# Patient Record
Sex: FEMALE | Race: BLACK OR AFRICAN AMERICAN | NOT HISPANIC OR LATINO | URBAN - METROPOLITAN AREA
[De-identification: names, ages, dates, MRNs, and addresses within clinical notes are randomized per-mention and may not be internally consistent; named-entity substitution may affect disease eponyms.]

---

## 2020-07-25 ENCOUNTER — TELEPHONE ENCOUNTER (OUTPATIENT)
Dept: URBAN - METROPOLITAN AREA CLINIC 13 | Facility: CLINIC | Age: 55
End: 2020-07-25

## 2020-07-26 ENCOUNTER — TELEPHONE ENCOUNTER (OUTPATIENT)
Dept: URBAN - METROPOLITAN AREA CLINIC 13 | Facility: CLINIC | Age: 55
End: 2020-07-26

## 2020-07-26 RX ORDER — ALENDRONATE SODIUM 70 MG/1
TAKE 1 TABLET ONCE WEEKLY TABLET ORAL
Refills: 0 | Status: ACTIVE | COMMUNITY

## 2020-07-26 RX ORDER — PREDNISONE 5 MG/1
TAKE 1 TABLET AS DIRECTED TABLET ORAL
Qty: 7 | Refills: 0 | Status: ACTIVE | COMMUNITY

## 2020-07-26 RX ORDER — POLYETHYLENE GLYCOL 3350, SODIUM SULFATE, SODIUM CHLORIDE, POTASSIUM CHLORIDE, ASCORBIC ACID, SODIUM ASCORBATE 7.5-2.691G
TAKE 32 OZ AS DIRECTED 5:00PM THE EVENING BEFORE AND 6HR PRIOR TO PROCEDURE KIT ORAL
Qty: 1 | Refills: 0 | Status: ACTIVE | COMMUNITY
Start: 2016-09-20

## 2020-07-26 RX ORDER — TEMAZEPAM 15 MG/1
TAKE 1 CAPSULE AT BEDTIME AS NEEDED FOR SLEEP CAPSULE ORAL
Refills: 0 | Status: ACTIVE | COMMUNITY

## 2020-07-26 RX ORDER — AMILORIDE HYDROCHLORIDE AND HYDROCHLOROTHIAZIDE 5; 50 MG/1; MG/1
TAKE 1 TABLET ONCE DAILY TABLET ORAL
Refills: 0 | Status: ACTIVE | COMMUNITY

## 2020-07-26 RX ORDER — BOSENTAN 125 MG/1
TAKE 1 TABLET TWICE DAILY TABLET, FILM COATED ORAL
Refills: 0 | Status: ACTIVE | COMMUNITY

## 2020-07-26 RX ORDER — ZOLPIDEM TARTRATE 10 MG/1
TAKE 1 TABLET AT BEDTIME AS NEEDED FOR SLEEP TABLET, FILM COATED ORAL
Refills: 0 | Status: ACTIVE | COMMUNITY

## 2020-07-26 RX ORDER — TRAMADOL HYDROCHLORIDE 50 MG/1
TAKE 1 TABLET BY MOUTH EVERY 6 HOURS AS NEEDED FOR PAIN TABLET ORAL
Qty: 50 | Refills: 3 | Status: ACTIVE | COMMUNITY

## 2021-05-26 ENCOUNTER — LAB OUTSIDE AN ENCOUNTER (OUTPATIENT)
Dept: URBAN - METROPOLITAN AREA CLINIC 113 | Facility: CLINIC | Age: 56
End: 2021-05-26

## 2021-05-26 ENCOUNTER — TELEPHONE ENCOUNTER (OUTPATIENT)
Dept: URBAN - METROPOLITAN AREA CLINIC 113 | Facility: CLINIC | Age: 56
End: 2021-05-26

## 2021-05-26 VITALS — BODY MASS INDEX: 21.5 KG/M2 | HEIGHT: 67 IN | WEIGHT: 137 LBS

## 2021-05-26 RX ORDER — POLYETHYLENE GLYCOL 3350, SODIUM SULFATE, SODIUM CHLORIDE, POTASSIUM CHLORIDE, ASCORBIC ACID, SODIUM ASCORBATE 7.5-2.691G
TAKE 32 OZ AS DIRECTED 5:00PM THE EVENING BEFORE AND 6HR PRIOR TO PROCEDURE KIT ORAL
Qty: 1 | Refills: 0 | Status: ON HOLD | COMMUNITY
Start: 2016-09-20

## 2021-05-26 RX ORDER — SODIUM, POTASSIUM,MAG SULFATES 17.5-3.13G
354 ML SOLUTION, RECONSTITUTED, ORAL ORAL
Qty: 354 MILLILITER | Refills: 0 | OUTPATIENT
Start: 2021-05-26 | End: 2021-05-27

## 2021-05-26 RX ORDER — AMILORIDE HYDROCHLORIDE AND HYDROCHLOROTHIAZIDE 5; 50 MG/1; MG/1
TAKE 1 TABLET ONCE DAILY TABLET ORAL
Refills: 0 | Status: ACTIVE | COMMUNITY

## 2021-05-26 RX ORDER — ALENDRONATE SODIUM 70 MG/1
TAKE 1 TABLET ONCE WEEKLY TABLET ORAL
Refills: 0 | Status: ON HOLD | COMMUNITY

## 2021-05-26 RX ORDER — BOSENTAN 125 MG/1
TAKE 1 TABLET TWICE DAILY TABLET, FILM COATED ORAL
Refills: 0 | Status: ON HOLD | COMMUNITY

## 2021-05-26 RX ORDER — ZOLPIDEM TARTRATE 10 MG/1
TAKE 1 TABLET AT BEDTIME AS NEEDED FOR SLEEP TABLET, FILM COATED ORAL
Refills: 0 | Status: ON HOLD | COMMUNITY

## 2021-05-26 RX ORDER — TEMAZEPAM 15 MG/1
TAKE 1 CAPSULE AT BEDTIME AS NEEDED FOR SLEEP CAPSULE ORAL
Refills: 0 | Status: ON HOLD | COMMUNITY

## 2021-05-26 RX ORDER — TRAMADOL HYDROCHLORIDE 50 MG/1
TAKE 1 TABLET BY MOUTH EVERY 6 HOURS AS NEEDED FOR PAIN TABLET ORAL
Qty: 50 | Refills: 3 | Status: ON HOLD | COMMUNITY

## 2021-05-26 RX ORDER — PREDNISONE 5 MG/1
TAKE 1 TABLET AS DIRECTED TABLET ORAL
Qty: 7 | Refills: 0 | Status: ON HOLD | COMMUNITY

## 2021-05-27 ENCOUNTER — TELEPHONE ENCOUNTER (OUTPATIENT)
Dept: URBAN - METROPOLITAN AREA CLINIC 113 | Facility: CLINIC | Age: 56
End: 2021-05-27

## 2021-05-27 RX ORDER — SODIUM, POTASSIUM,MAG SULFATES 17.5-3.13G
354 ML SOLUTION, RECONSTITUTED, ORAL ORAL
Qty: 354 MILLILITER | Refills: 0 | OUTPATIENT
Start: 2021-05-27 | End: 2021-05-28

## 2021-06-03 ENCOUNTER — TELEPHONE ENCOUNTER (OUTPATIENT)
Dept: URBAN - METROPOLITAN AREA CLINIC 96 | Facility: CLINIC | Age: 56
End: 2021-06-03

## 2021-06-24 ENCOUNTER — CLAIMS CREATED FROM THE CLAIM WINDOW (OUTPATIENT)
Dept: URBAN - METROPOLITAN AREA CLINIC 4 | Facility: CLINIC | Age: 56
End: 2021-06-24
Payer: MEDICARE

## 2021-06-24 ENCOUNTER — OFFICE VISIT (OUTPATIENT)
Dept: URBAN - METROPOLITAN AREA SURGERY CENTER 25 | Facility: SURGERY CENTER | Age: 56
End: 2021-06-24
Payer: MEDICARE

## 2021-06-24 DIAGNOSIS — Z86.010 H/O ADENOMATOUS POLYP OF COLON: ICD-10-CM

## 2021-06-24 DIAGNOSIS — D12.2 ADENOMA OF ASCENDING COLON: ICD-10-CM

## 2021-06-24 DIAGNOSIS — D12.2 BENIGN NEOPLASM OF ASCENDING COLON: ICD-10-CM

## 2021-06-24 PROCEDURE — 88305 TISSUE EXAM BY PATHOLOGIST: CPT | Performed by: PATHOLOGY

## 2021-06-24 PROCEDURE — 45385 COLONOSCOPY W/LESION REMOVAL: CPT | Performed by: INTERNAL MEDICINE

## 2021-06-24 PROCEDURE — G8907 PT DOC NO EVENTS ON DISCHARG: HCPCS | Performed by: INTERNAL MEDICINE

## 2021-06-24 RX ORDER — POLYETHYLENE GLYCOL 3350, SODIUM SULFATE, SODIUM CHLORIDE, POTASSIUM CHLORIDE, ASCORBIC ACID, SODIUM ASCORBATE 7.5-2.691G
TAKE 32 OZ AS DIRECTED 5:00PM THE EVENING BEFORE AND 6HR PRIOR TO PROCEDURE KIT ORAL
Qty: 1 | Refills: 0 | Status: ON HOLD | COMMUNITY
Start: 2016-09-20

## 2021-06-24 RX ORDER — PREDNISONE 5 MG/1
TAKE 1 TABLET AS DIRECTED TABLET ORAL
Qty: 7 | Refills: 0 | Status: ON HOLD | COMMUNITY

## 2021-06-24 RX ORDER — TRAMADOL HYDROCHLORIDE 50 MG/1
TAKE 1 TABLET BY MOUTH EVERY 6 HOURS AS NEEDED FOR PAIN TABLET ORAL
Qty: 50 | Refills: 3 | Status: ON HOLD | COMMUNITY

## 2021-06-24 RX ORDER — ALENDRONATE SODIUM 70 MG/1
TAKE 1 TABLET ONCE WEEKLY TABLET ORAL
Refills: 0 | Status: ON HOLD | COMMUNITY

## 2021-06-24 RX ORDER — TEMAZEPAM 15 MG/1
TAKE 1 CAPSULE AT BEDTIME AS NEEDED FOR SLEEP CAPSULE ORAL
Refills: 0 | Status: ON HOLD | COMMUNITY

## 2021-06-24 RX ORDER — BOSENTAN 125 MG/1
TAKE 1 TABLET TWICE DAILY TABLET, FILM COATED ORAL
Refills: 0 | Status: ON HOLD | COMMUNITY

## 2021-06-24 RX ORDER — AMILORIDE HYDROCHLORIDE AND HYDROCHLOROTHIAZIDE 5; 50 MG/1; MG/1
TAKE 1 TABLET ONCE DAILY TABLET ORAL
Refills: 0 | Status: ACTIVE | COMMUNITY

## 2021-06-24 RX ORDER — ZOLPIDEM TARTRATE 10 MG/1
TAKE 1 TABLET AT BEDTIME AS NEEDED FOR SLEEP TABLET, FILM COATED ORAL
Refills: 0 | Status: ON HOLD | COMMUNITY

## 2021-07-09 ENCOUNTER — OFFICE VISIT (OUTPATIENT)
Dept: URBAN - METROPOLITAN AREA CLINIC 113 | Facility: CLINIC | Age: 56
End: 2021-07-09

## 2022-06-29 ENCOUNTER — WEB ENCOUNTER (OUTPATIENT)
Dept: URBAN - METROPOLITAN AREA CLINIC 113 | Facility: CLINIC | Age: 57
End: 2022-06-29

## 2022-06-30 ENCOUNTER — LAB OUTSIDE AN ENCOUNTER (OUTPATIENT)
Dept: URBAN - METROPOLITAN AREA CLINIC 113 | Facility: CLINIC | Age: 57
End: 2022-06-30

## 2022-06-30 ENCOUNTER — OFFICE VISIT (OUTPATIENT)
Dept: URBAN - METROPOLITAN AREA CLINIC 113 | Facility: CLINIC | Age: 57
End: 2022-06-30
Payer: MEDICARE

## 2022-06-30 VITALS
WEIGHT: 150 LBS | BODY MASS INDEX: 23.54 KG/M2 | DIASTOLIC BLOOD PRESSURE: 76 MMHG | RESPIRATION RATE: 18 BRPM | HEART RATE: 50 BPM | TEMPERATURE: 97.3 F | SYSTOLIC BLOOD PRESSURE: 103 MMHG | HEIGHT: 67 IN

## 2022-06-30 DIAGNOSIS — Z86.010 HISTORY OF ADENOMATOUS POLYP OF COLON: ICD-10-CM

## 2022-06-30 DIAGNOSIS — R74.8 ELEVATED LIVER ENZYMES: ICD-10-CM

## 2022-06-30 DIAGNOSIS — R93.5 ABNORMAL ABDOMINAL CT SCAN: ICD-10-CM

## 2022-06-30 PROCEDURE — 99214 OFFICE O/P EST MOD 30 MIN: CPT | Performed by: NURSE PRACTITIONER

## 2022-06-30 PROCEDURE — 76700 US EXAM ABDOM COMPLETE: CPT | Performed by: NURSE PRACTITIONER

## 2022-06-30 RX ORDER — CICLOPIROX 80 MG/ML
1 APPLICATION SOLUTION TOPICAL ONCE A DAY
Status: ACTIVE | COMMUNITY

## 2022-06-30 RX ORDER — POLYETHYLENE GLYCOL 3350, SODIUM SULFATE, SODIUM CHLORIDE, POTASSIUM CHLORIDE, ASCORBIC ACID, SODIUM ASCORBATE 7.5-2.691G
TAKE 32 OZ AS DIRECTED 5:00PM THE EVENING BEFORE AND 6HR PRIOR TO PROCEDURE KIT ORAL
Qty: 1 | Refills: 0 | Status: ON HOLD | COMMUNITY
Start: 2016-09-20

## 2022-06-30 RX ORDER — ZOLPIDEM TARTRATE 10 MG/1
TAKE 1 TABLET AT BEDTIME AS NEEDED FOR SLEEP TABLET, FILM COATED ORAL
Refills: 0 | Status: ON HOLD | COMMUNITY

## 2022-06-30 RX ORDER — AMILORIDE HYDROCHLORIDE AND HYDROCHLOROTHIAZIDE 5; 50 MG/1; MG/1
TAKE 1 TABLET ONCE DAILY TABLET ORAL
Refills: 0 | Status: ACTIVE | COMMUNITY

## 2022-06-30 RX ORDER — ATORVASTATIN CALCIUM 20 MG/1
1 TABLET TABLET, FILM COATED ORAL ONCE A DAY
Status: ACTIVE | COMMUNITY

## 2022-06-30 RX ORDER — TRAMADOL HYDROCHLORIDE 50 MG/1
TAKE 1 TABLET BY MOUTH EVERY 6 HOURS AS NEEDED FOR PAIN TABLET ORAL
Qty: 50 | Refills: 3 | Status: ON HOLD | COMMUNITY

## 2022-06-30 RX ORDER — BOSENTAN 125 MG/1
TAKE 1 TABLET TWICE DAILY TABLET, FILM COATED ORAL
Refills: 0 | Status: ON HOLD | COMMUNITY

## 2022-06-30 RX ORDER — PREDNISONE 5 MG/1
TAKE 1 TABLET AS DIRECTED TABLET ORAL
Qty: 7 | Refills: 0 | Status: ON HOLD | COMMUNITY

## 2022-06-30 RX ORDER — ALENDRONATE SODIUM 70 MG/1
TAKE 1 TABLET ONCE WEEKLY TABLET ORAL
Refills: 0 | Status: ON HOLD | COMMUNITY

## 2022-06-30 RX ORDER — TEMAZEPAM 15 MG/1
TAKE 1 CAPSULE AT BEDTIME AS NEEDED FOR SLEEP CAPSULE ORAL
Refills: 0 | Status: ON HOLD | COMMUNITY

## 2022-06-30 NOTE — HPI-TODAY'S VISIT:
Ms. Simms is a 57-year-old -American female with a history of sarcoidosis complicated by pulmonary hypertension (Dr. Márquez), right-sided heart failure, fibromyalgia, presenting for evaluation of abnormal CT findings of the liver. She was previously managed in our office by Dr. Quintanilla. She was followed in our office in the past for abdominal pain, nausea and vomiting, and constipation attributed to a functional bowel disorder. Work-up at that time is outlined below. Currently, she is fairly asymptomatic from a GI standpoint. She has 2-4 nonbloody stools per day, which have been softener since starting a new cholesterol medication. She recently experienced a brief episode of right upper quadrant abdominal pain, but this has resolved. She denies nausea or vomiting. Her only complaint is of malodorous stool. She is following with ortho for management of back pain.  Recent CT of the chest without contrast 5/9/22 incidentally noted diffusely elevated liver density, most commonly seen in the setting of primary or secondary hemochromatosis or amiodarone use. Labs August 2021 show AST 45, ALT 33, , Tbili 0.4.

## 2022-06-30 NOTE — HPI-OTHER HISTORIES
Colonoscopy 6/24/21 by Dr. Stokes: a 20mm in the ascending colon, tattoo noted at 55cm, mild external hemorrhoids. Repeat colonoscopy recommended in 1 year. Colonoscopy 10/3/16 by Dr. Quintanilla: one 20mm sessile serrated adenoma at the hepatic flexure which was removed piecemeal, diverticulosis in the sigmoid colon, nonbleeding internal hemorrhoids. EGD was performed on 8/16/16 by Dr. Quintanilla. Findings included LA grade A reflux esophagitis, diffuse moderate inflammation characterized by congestion, erosions and erythema in the gastric body and antrum with bilious fluid found in the antrum. Gastric biopsies revealed mild inactive chronic gastritis with features of chemical/reactive gastropathy, focal intestinal metaplasia present, no H. pylori. H. pylori breath test was negative on 8/1/16.
2.38

## 2022-07-01 ENCOUNTER — TELEPHONE ENCOUNTER (OUTPATIENT)
Dept: URBAN - METROPOLITAN AREA CLINIC 113 | Facility: CLINIC | Age: 57
End: 2022-07-01

## 2022-07-06 LAB
A/G RATIO: 1.4
ACTIN (SMOOTH MUSCLE) ANTIBODY: 9
ALBUMIN: 4.6
ALKALINE PHOSPHATASE: 102
ALT (SGPT): 17
ANA DIRECT: NEGATIVE
AST (SGOT): 30
BASO (ABSOLUTE): 0.1
BASOS: 1
BILIRUBIN, TOTAL: 0.3
BUN/CREATININE RATIO: 21
BUN: 30
CALCIUM: 9.6
CARBON DIOXIDE, TOTAL: 23
CHLORIDE: 103
CREATININE: 1.4
EGFR: 44
EOS (ABSOLUTE): 0.3
EOS: 3
FERRITIN, SERUM: 224
GLOBULIN, TOTAL: 3.3
GLUCOSE: 79
HBSAG SCREEN: NEGATIVE
HEMATOCRIT: 39.1
HEMATOLOGY COMMENTS:: (no result)
HEMOGLOBIN: 12.7
HEP A AB, TOTAL: NEGATIVE
HEP B CORE AB, TOT: NEGATIVE
HEP B SURFACE AB, QUAL: NON REACTIVE
HEP C VIRUS AB: <0.1
HEREDITARY  HEMOCHROMATOSIS: (no result)
IMMATURE CELLS: (no result)
IMMATURE GRANS (ABS): 0
IMMATURE GRANULOCYTES: 0
IMMUNOGLOBULIN A, QN, SERUM: 404
IMMUNOGLOBULIN E, TOTAL: 28
IMMUNOGLOBULIN G, QN, SERUM: 1950
IMMUNOGLOBULIN M, QN, SERUM: 78
LYMPHS (ABSOLUTE): 2.8
LYMPHS: 36
MCH: 28.4
MCHC: 32.5
MCV: 88
MITOCHONDRIAL (M2) ANTIBODY: <20
MONOCYTES(ABSOLUTE): 0.6
MONOCYTES: 8
NEUTROPHILS (ABSOLUTE): 3.9
NEUTROPHILS: 52
NRBC: (no result)
PLATELETS: 256
POTASSIUM: 4.4
PROTEIN, TOTAL: 7.9
RBC: 4.47
RDW: 13.6
SODIUM: 141
WBC: 7.6

## 2022-07-23 LAB
REQUEST PROBLEM: (no result)
WRITTEN AUTHORIZATION: (no result)

## 2022-10-11 ENCOUNTER — OFFICE VISIT (OUTPATIENT)
Dept: URBAN - METROPOLITAN AREA CLINIC 113 | Facility: CLINIC | Age: 57
End: 2022-10-11
Payer: MEDICARE

## 2022-10-11 ENCOUNTER — TELEPHONE ENCOUNTER (OUTPATIENT)
Dept: URBAN - METROPOLITAN AREA CLINIC 113 | Facility: CLINIC | Age: 57
End: 2022-10-11

## 2022-10-11 ENCOUNTER — LAB OUTSIDE AN ENCOUNTER (OUTPATIENT)
Dept: URBAN - METROPOLITAN AREA CLINIC 113 | Facility: CLINIC | Age: 57
End: 2022-10-11

## 2022-10-11 VITALS
BODY MASS INDEX: 23.23 KG/M2 | HEART RATE: 50 BPM | RESPIRATION RATE: 16 BRPM | TEMPERATURE: 97.3 F | WEIGHT: 148 LBS | SYSTOLIC BLOOD PRESSURE: 128 MMHG | DIASTOLIC BLOOD PRESSURE: 88 MMHG | HEIGHT: 67 IN

## 2022-10-11 DIAGNOSIS — Z86.010 PERSONAL HISTORY OF COLONIC POLYPS: ICD-10-CM

## 2022-10-11 DIAGNOSIS — R74.8 ELEVATED LIVER ENZYMES: ICD-10-CM

## 2022-10-11 PROCEDURE — 99214 OFFICE O/P EST MOD 30 MIN: CPT | Performed by: INTERNAL MEDICINE

## 2022-10-11 RX ORDER — SODIUM, POTASSIUM,MAG SULFATES 17.5-3.13G
177 ML SOLUTION, RECONSTITUTED, ORAL ORAL
Qty: 177 ML | Refills: 0 | OUTPATIENT
Start: 2022-10-11 | End: 2022-10-12

## 2022-10-11 RX ORDER — ATORVASTATIN CALCIUM 20 MG/1
1 TABLET TABLET, FILM COATED ORAL ONCE A DAY
Status: ACTIVE | COMMUNITY

## 2022-10-11 RX ORDER — TRAMADOL HYDROCHLORIDE 50 MG/1
TAKE 1 TABLET BY MOUTH EVERY 6 HOURS AS NEEDED FOR PAIN TABLET ORAL
Qty: 50 | Refills: 3 | Status: ON HOLD | COMMUNITY

## 2022-10-11 RX ORDER — TEMAZEPAM 15 MG/1
TAKE 1 CAPSULE AT BEDTIME AS NEEDED FOR SLEEP CAPSULE ORAL
Refills: 0 | Status: ON HOLD | COMMUNITY

## 2022-10-11 RX ORDER — POLYETHYLENE GLYCOL 3350, SODIUM SULFATE, SODIUM CHLORIDE, POTASSIUM CHLORIDE, ASCORBIC ACID, SODIUM ASCORBATE 7.5-2.691G
TAKE 32 OZ AS DIRECTED 5:00PM THE EVENING BEFORE AND 6HR PRIOR TO PROCEDURE KIT ORAL
Qty: 1 | Refills: 0 | Status: ON HOLD | COMMUNITY
Start: 2016-09-20

## 2022-10-11 RX ORDER — CICLOPIROX 80 MG/ML
1 APPLICATION SOLUTION TOPICAL ONCE A DAY
Status: ON HOLD | COMMUNITY

## 2022-10-11 RX ORDER — ZOLPIDEM TARTRATE 10 MG/1
TAKE 1 TABLET AT BEDTIME AS NEEDED FOR SLEEP TABLET, FILM COATED ORAL
Refills: 0 | Status: ON HOLD | COMMUNITY

## 2022-10-11 RX ORDER — BOSENTAN 125 MG/1
TAKE 1 TABLET TWICE DAILY TABLET, FILM COATED ORAL
Refills: 0 | Status: ON HOLD | COMMUNITY

## 2022-10-11 RX ORDER — PREDNISONE 5 MG/1
TAKE 1 TABLET AS DIRECTED TABLET ORAL
Qty: 7 | Refills: 0 | Status: ON HOLD | COMMUNITY

## 2022-10-11 RX ORDER — ALENDRONATE SODIUM 70 MG/1
TAKE 1 TABLET ONCE WEEKLY TABLET ORAL
Refills: 0 | Status: ON HOLD | COMMUNITY

## 2022-10-11 RX ORDER — AMILORIDE HYDROCHLORIDE AND HYDROCHLOROTHIAZIDE 5; 50 MG/1; MG/1
TAKE 1 TABLET ONCE DAILY TABLET ORAL
Refills: 0 | Status: ACTIVE | COMMUNITY

## 2022-10-11 NOTE — HPI-OTHER HISTORIES
Colonoscopy 6/24/21 by Dr. Stokes: a 20mm in the ascending colon, tattoo noted at 55cm, mild external hemorrhoids. Repeat colonoscopy recommended in 1 year. . Colonoscopy 10/3/16 by Dr. Quintanilla: one 20mm sessile serrated adenoma at the hepatic flexure which was removed piecemeal, diverticulosis in the sigmoid colon, nonbleeding internal hemorrhoids. . EGD was performed on 8/16/16 by Dr. Quintanilla. Findings included LA grade A reflux esophagitis, diffuse moderate inflammation characterized by congestion, erosions and erythema in the gastric body and antrum with bilious fluid found in the antrum. Gastric biopsies revealed mild inactive chronic gastritis with features of chemical/reactive gastropathy, focal intestinal metaplasia present, no H. pylori. H. pylori breath test was negative on 8/1/16.

## 2022-10-11 NOTE — HPI-TODAY'S VISIT:
Ms. Simms is a 57-year-old -American female with a history of sarcoidosis complicated by pulmonary hypertension (Dr. Márquez), right-sided heart failure, fibromyalgia, presenting for evaluation of elastography. . She was followed in our office in the past for abdominal pain, nausea and vomiting, and constipation attributed to a functional bowel disorder. Work-up at that time is outlined below. . She denies nausea or vomiting. Her only complaint is of malodorous stool.  . Ultrasound with elastography July 2022.  Median score 5.69 indicating minimal probability of hepatic fibrosis. . CT scan chest May 2022.  Diffusely elevated liver density suggestive of possible iron overload.  Could be secondary to iron overload or possible previous amiodarone exposure.  Also she had bilateral bronchiectasis with prominent pulmonary artery suggesting the possibility of pulmonary arterial hypertension. . Recent CT of the chest without contrast 5/9/22 incidentally noted diffusely elevated liver density, most commonly seen in the setting of primary or secondary hemochromatosis or amiodarone use. Labs August 2021 show AST 45, ALT 33, , Tbili 0.4.

## 2022-11-04 ENCOUNTER — LAB OUTSIDE AN ENCOUNTER (OUTPATIENT)
Dept: URBAN - METROPOLITAN AREA CLINIC 113 | Facility: CLINIC | Age: 57
End: 2022-11-04

## 2022-11-09 LAB
A/G RATIO: 1.2
ABSOLUTE BASOPHILS: 51
ABSOLUTE EOSINOPHILS: 204
ABSOLUTE LYMPHOCYTES: 3222
ABSOLUTE MONOCYTES: 553
ABSOLUTE NEUTROPHILS: 4471
ACTIN (SMOOTH MUSCLE) ANTIBODY (IGG): <20
ALBUMIN: 4.1
ALKALINE PHOSPHATASE: 74
ALT (SGPT): 11
AST (SGOT): 22
BASOPHILS: 0.6
BILIRUBIN, TOTAL: 0.4
BUN/CREATININE RATIO: 15
BUN: 20
CALCIUM: 9.2
CARBON DIOXIDE, TOTAL: 22
CHLORIDE: 107
CREATININE: 1.3
EGFR: 49
EOSINOPHILS: 2.4
FERRITIN, SERUM: 122
GLOBULIN, TOTAL: 3.5
GLUCOSE: 80
HEMATOCRIT: 39.3
HEMOGLOBIN: 12.6
HEPATITIS A AB, TOTAL: (no result)
HEPATITIS B CORE AB TOTAL: (no result)
HEPATITIS B SURFACE AB, QN: <5
HEPATITIS B SURFACE ANTIGEN: (no result)
HEPATITIS C ANTIBODY: (no result)
INDEX: 0.1
INTERPRETATION: (no result)
IRON BIND.CAP.(TIBC): 256
IRON SATURATION: 23
IRON: 58
LYMPHOCYTES: 37.9
MCH: 27.1
MCHC: 32.1
MCV: 84.5
MITOCHONDRIAL (M2) ANTIBODY: <=20
MONOCYTES: 6.5
MPV: 11.1
NEUTROPHILS: 52.6
PLATELET COUNT: 286
POTASSIUM: 4
PROTEIN, TOTAL: 7.6
RDW: 13.4
RED BLOOD CELL COUNT: 4.65
SODIUM: 139
WHITE BLOOD CELL COUNT: 8.5

## 2023-01-06 ENCOUNTER — OFFICE VISIT (OUTPATIENT)
Dept: URBAN - METROPOLITAN AREA SURGERY CENTER 25 | Facility: SURGERY CENTER | Age: 58
End: 2023-01-06
Payer: MEDICARE

## 2023-01-06 ENCOUNTER — CLAIMS CREATED FROM THE CLAIM WINDOW (OUTPATIENT)
Dept: URBAN - METROPOLITAN AREA CLINIC 4 | Facility: CLINIC | Age: 58
End: 2023-01-06
Payer: MEDICARE

## 2023-01-06 DIAGNOSIS — K63.5 HYPERPLASTIC POLYP OF ASCENDING COLON: ICD-10-CM

## 2023-01-06 DIAGNOSIS — Z86.010 COLON POLYP HISTORY: ICD-10-CM

## 2023-01-06 DIAGNOSIS — K63.89 OTHER SPECIFIED DISEASES OF INTESTINE: ICD-10-CM

## 2023-01-06 PROCEDURE — G8907 PT DOC NO EVENTS ON DISCHARG: HCPCS | Performed by: INTERNAL MEDICINE

## 2023-01-06 PROCEDURE — 88305 TISSUE EXAM BY PATHOLOGIST: CPT | Performed by: PATHOLOGY

## 2023-01-06 PROCEDURE — 45385 COLONOSCOPY W/LESION REMOVAL: CPT | Performed by: INTERNAL MEDICINE

## 2023-01-06 RX ORDER — CICLOPIROX 80 MG/ML
1 APPLICATION SOLUTION TOPICAL ONCE A DAY
Status: ON HOLD | COMMUNITY

## 2023-01-06 RX ORDER — ZOLPIDEM TARTRATE 10 MG/1
TAKE 1 TABLET AT BEDTIME AS NEEDED FOR SLEEP TABLET, FILM COATED ORAL
Refills: 0 | Status: ON HOLD | COMMUNITY

## 2023-01-06 RX ORDER — BOSENTAN 125 MG/1
TAKE 1 TABLET TWICE DAILY TABLET, FILM COATED ORAL
Refills: 0 | Status: ON HOLD | COMMUNITY

## 2023-01-06 RX ORDER — PREDNISONE 5 MG/1
TAKE 1 TABLET AS DIRECTED TABLET ORAL
Qty: 7 | Refills: 0 | Status: ON HOLD | COMMUNITY

## 2023-01-06 RX ORDER — TEMAZEPAM 15 MG/1
TAKE 1 CAPSULE AT BEDTIME AS NEEDED FOR SLEEP CAPSULE ORAL
Refills: 0 | Status: ON HOLD | COMMUNITY

## 2023-01-06 RX ORDER — POLYETHYLENE GLYCOL 3350, SODIUM SULFATE, SODIUM CHLORIDE, POTASSIUM CHLORIDE, ASCORBIC ACID, SODIUM ASCORBATE 7.5-2.691G
TAKE 32 OZ AS DIRECTED 5:00PM THE EVENING BEFORE AND 6HR PRIOR TO PROCEDURE KIT ORAL
Qty: 1 | Refills: 0 | Status: ON HOLD | COMMUNITY
Start: 2016-09-20

## 2023-01-06 RX ORDER — TRAMADOL HYDROCHLORIDE 50 MG/1
TAKE 1 TABLET BY MOUTH EVERY 6 HOURS AS NEEDED FOR PAIN TABLET ORAL
Qty: 50 | Refills: 3 | Status: ON HOLD | COMMUNITY

## 2023-01-06 RX ORDER — ATORVASTATIN CALCIUM 20 MG/1
1 TABLET TABLET, FILM COATED ORAL ONCE A DAY
Status: ACTIVE | COMMUNITY

## 2023-01-06 RX ORDER — ALENDRONATE SODIUM 70 MG/1
TAKE 1 TABLET ONCE WEEKLY TABLET ORAL
Refills: 0 | Status: ON HOLD | COMMUNITY

## 2023-01-06 RX ORDER — AMILORIDE HYDROCHLORIDE AND HYDROCHLOROTHIAZIDE 5; 50 MG/1; MG/1
TAKE 1 TABLET ONCE DAILY TABLET ORAL
Refills: 0 | Status: ACTIVE | COMMUNITY

## 2023-01-26 PROBLEM — 428283002 HISTORY OF POLYP OF COLON: Status: ACTIVE | Noted: 2023-01-13

## 2023-01-30 ENCOUNTER — OFFICE VISIT (OUTPATIENT)
Dept: URBAN - METROPOLITAN AREA CLINIC 113 | Facility: CLINIC | Age: 58
End: 2023-01-30

## 2023-02-10 ENCOUNTER — OFFICE VISIT (OUTPATIENT)
Dept: URBAN - METROPOLITAN AREA CLINIC 113 | Facility: CLINIC | Age: 58
End: 2023-02-10
Payer: MEDICARE

## 2023-02-10 VITALS
HEART RATE: 82 BPM | DIASTOLIC BLOOD PRESSURE: 82 MMHG | WEIGHT: 147 LBS | RESPIRATION RATE: 18 BRPM | SYSTOLIC BLOOD PRESSURE: 122 MMHG | BODY MASS INDEX: 23.07 KG/M2 | HEIGHT: 67 IN | TEMPERATURE: 97.5 F

## 2023-02-10 DIAGNOSIS — R74.8 ELEVATED LIVER ENZYMES: ICD-10-CM

## 2023-02-10 PROBLEM — 707724006 ELEVATED LIVER ENZYMES LEVEL: Status: ACTIVE | Noted: 2022-10-11

## 2023-02-10 PROBLEM — 428283002 HISTORY OF POLYP OF COLON (SITUATION): Status: ACTIVE | Noted: 2021-05-26

## 2023-02-10 PROCEDURE — 99213 OFFICE O/P EST LOW 20 MIN: CPT | Performed by: NURSE PRACTITIONER

## 2023-02-10 RX ORDER — PREDNISONE 5 MG/1
TAKE 1 TABLET AS DIRECTED TABLET ORAL
Qty: 7 | Refills: 0 | Status: ON HOLD | COMMUNITY

## 2023-02-10 RX ORDER — AMLODIPINE BESYLATE 10 MG/1
1 TABLET TABLET ORAL ONCE A DAY
Status: ACTIVE | COMMUNITY

## 2023-02-10 RX ORDER — POLYETHYLENE GLYCOL 3350, SODIUM SULFATE, SODIUM CHLORIDE, POTASSIUM CHLORIDE, ASCORBIC ACID, SODIUM ASCORBATE 7.5-2.691G
TAKE 32 OZ AS DIRECTED 5:00PM THE EVENING BEFORE AND 6HR PRIOR TO PROCEDURE KIT ORAL
Qty: 1 | Refills: 0 | Status: ON HOLD | COMMUNITY
Start: 2016-09-20

## 2023-02-10 RX ORDER — TRAMADOL HYDROCHLORIDE 50 MG/1
TAKE 1 TABLET BY MOUTH EVERY 6 HOURS AS NEEDED FOR PAIN TABLET ORAL
Qty: 50 | Refills: 3 | Status: ON HOLD | COMMUNITY

## 2023-02-10 RX ORDER — AMILORIDE HYDROCHLORIDE AND HYDROCHLOROTHIAZIDE 5; 50 MG/1; MG/1
TAKE 1 TABLET ONCE DAILY TABLET ORAL
Refills: 0 | Status: ON HOLD | COMMUNITY

## 2023-02-10 RX ORDER — TEMAZEPAM 15 MG/1
TAKE 1 CAPSULE AT BEDTIME AS NEEDED FOR SLEEP CAPSULE ORAL
Refills: 0 | Status: ON HOLD | COMMUNITY

## 2023-02-10 RX ORDER — CICLOPIROX 80 MG/ML
1 APPLICATION SOLUTION TOPICAL ONCE A DAY
Status: ON HOLD | COMMUNITY

## 2023-02-10 RX ORDER — BOSENTAN 125 MG/1
TAKE 1 TABLET TWICE DAILY TABLET, FILM COATED ORAL
Refills: 0 | Status: ON HOLD | COMMUNITY

## 2023-02-10 RX ORDER — ZOLPIDEM TARTRATE 10 MG/1
TAKE 1 TABLET AT BEDTIME AS NEEDED FOR SLEEP TABLET, FILM COATED ORAL
Refills: 0 | Status: ON HOLD | COMMUNITY

## 2023-02-10 RX ORDER — ATORVASTATIN CALCIUM 20 MG/1
1 TABLET TABLET, FILM COATED ORAL ONCE A DAY
Status: ON HOLD | COMMUNITY

## 2023-02-10 RX ORDER — ALENDRONATE SODIUM 70 MG/1
TAKE 1 TABLET ONCE WEEKLY TABLET ORAL
Refills: 0 | Status: ON HOLD | COMMUNITY

## 2023-02-10 NOTE — HPI-TODAY'S VISIT:
Ms. Simms is a 57-year-old -American female with a history of sarcoidosis complicated by pulmonary hypertension (Dr. Márquez), right-sided heart failure, fibromyalgia, presenting for follow-up after colonoscopy.

## 2023-02-10 NOTE — HPI-TODAY'S VISIT:
She was seen in the office in October for follow-up regarding elevated liver enzymes.  Previous ultrasound with elastography showed minimal risk for fibrosis.  She was encouraged to proceed with labs as previously ordered to exclude secondary cause of liver disease.  A colonoscopy was planned for polyp surveillance. Labs 11/4/2022:H/H12.6/39.3, MCV 84.5, , WBC 8.5.  CMP unremarkable aside from creatinine 1.3, normal LFTs with AST 22, ALT 11, ALP 74, T. bili 0.4.  Negative AMA, SHANTI, ASMA.  Ferritin 122.  Iron 58, TIBC 256, iron sat 23.  Negative hepatitis A antibody total, hepatitis B surface antigen, hepatitis B surface antibody, hepatitis B core antibody total, hepatitis C antibody.  Mildly elevated serum IgG, otherwise normal immunoglobulins. Colonoscopy 9/6/2023:A 10 mm hyperplastic polyp removed from the ascending colon, markedly tortuous sigmoid colon, tattoo noted at 50 cm, normal TI, mild external hemorrhoids.  Repeat colonoscopy recommended in 3 years for surveillance. Overall, she is doing well from a GI standpoint. Her bowel habits are regular with dehydrated fruit and periodic cleanses. She denies abdominal pain, nausea, vomiting, or blood per rectum.

## 2023-08-24 ENCOUNTER — LAB OUTSIDE AN ENCOUNTER (OUTPATIENT)
Dept: URBAN - METROPOLITAN AREA CLINIC 113 | Facility: CLINIC | Age: 58
End: 2023-08-24

## 2023-08-24 ENCOUNTER — OFFICE VISIT (OUTPATIENT)
Dept: URBAN - METROPOLITAN AREA CLINIC 113 | Facility: CLINIC | Age: 58
End: 2023-08-24
Payer: MEDICARE

## 2023-08-24 VITALS
HEART RATE: 70 BPM | SYSTOLIC BLOOD PRESSURE: 124 MMHG | DIASTOLIC BLOOD PRESSURE: 96 MMHG | BODY MASS INDEX: 21.66 KG/M2 | HEIGHT: 67 IN | WEIGHT: 138 LBS | TEMPERATURE: 97.3 F | RESPIRATION RATE: 18 BRPM

## 2023-08-24 DIAGNOSIS — R19.4 CHANGE IN BOWEL HABITS: ICD-10-CM

## 2023-08-24 DIAGNOSIS — R63.4 WEIGHT LOSS, UNINTENTIONAL: ICD-10-CM

## 2023-08-24 DIAGNOSIS — R10.84 GENERALIZED ABDOMINAL PAIN: ICD-10-CM

## 2023-08-24 PROCEDURE — 99214 OFFICE O/P EST MOD 30 MIN: CPT | Performed by: NURSE PRACTITIONER

## 2023-08-24 RX ORDER — BOSENTAN 125 MG/1
TAKE 1 TABLET TWICE DAILY TABLET, FILM COATED ORAL
Refills: 0 | Status: ON HOLD | COMMUNITY

## 2023-08-24 RX ORDER — AMILORIDE HYDROCHLORIDE AND HYDROCHLOROTHIAZIDE 5; 50 MG/1; MG/1
TAKE 1 TABLET ONCE DAILY TABLET ORAL
Refills: 0 | Status: ON HOLD | COMMUNITY

## 2023-08-24 RX ORDER — CICLOPIROX 80 MG/ML
1 APPLICATION SOLUTION TOPICAL ONCE A DAY
Status: ON HOLD | COMMUNITY

## 2023-08-24 RX ORDER — AMLODIPINE BESYLATE 10 MG/1
1 TABLET TABLET ORAL ONCE A DAY
Status: ACTIVE | COMMUNITY

## 2023-08-24 RX ORDER — ZOLPIDEM TARTRATE 10 MG/1
TAKE 1 TABLET AT BEDTIME AS NEEDED FOR SLEEP TABLET, FILM COATED ORAL
Refills: 0 | Status: ON HOLD | COMMUNITY

## 2023-08-24 RX ORDER — ALENDRONATE SODIUM 70 MG/1
TAKE 1 TABLET ONCE WEEKLY TABLET ORAL
Refills: 0 | Status: ON HOLD | COMMUNITY

## 2023-08-24 RX ORDER — POLYETHYLENE GLYCOL 3350, SODIUM SULFATE, SODIUM CHLORIDE, POTASSIUM CHLORIDE, ASCORBIC ACID, SODIUM ASCORBATE 7.5-2.691G
TAKE 32 OZ AS DIRECTED 5:00PM THE EVENING BEFORE AND 6HR PRIOR TO PROCEDURE KIT ORAL
Qty: 1 | Refills: 0 | Status: ON HOLD | COMMUNITY
Start: 2016-09-20

## 2023-08-24 RX ORDER — TEMAZEPAM 15 MG/1
TAKE 1 CAPSULE AT BEDTIME AS NEEDED FOR SLEEP CAPSULE ORAL
Refills: 0 | Status: ON HOLD | COMMUNITY

## 2023-08-24 RX ORDER — PREDNISONE 5 MG/1
TAKE 1 TABLET AS DIRECTED TABLET ORAL
Qty: 7 | Refills: 0 | Status: ON HOLD | COMMUNITY

## 2023-08-24 RX ORDER — ATORVASTATIN CALCIUM 20 MG/1
1 TABLET TABLET, FILM COATED ORAL ONCE A DAY
Status: ON HOLD | COMMUNITY

## 2023-08-24 RX ORDER — TRAMADOL HYDROCHLORIDE 50 MG/1
TAKE 1 TABLET BY MOUTH EVERY 6 HOURS AS NEEDED FOR PAIN TABLET ORAL
Qty: 50 | Refills: 3 | Status: ON HOLD | COMMUNITY

## 2023-08-24 NOTE — HPI-TODAY'S VISIT:
Ms. Simms is a 58-year-old -American female with a history of sarcoidosis complicated by pulmonary hypertension (Dr. Márquez), right-sided heart failure, fibromyalgia, presenting for evaluation of abdominal pain and a change in bowel habits.  She was seen in the office in February for follow-up of elevated liver enzymes. Work-up at that time was negative for secondary cause of liver disease and US with elastography showed minimal risk for fibrosis. Recent labs showed normalization of her LFTs, so continued monitoring was recommended. Since February, she has experienced ongoing GI issues. This started after taking a supplement created by a friend. She took two capsules daily for about a month. During this time, she experienced severe constipation. She went over a week without a bowel movement, followed by pebble-like stools. She tried everything naturally to get the bowels moving. She reports associated right sided abdominal spasm which is worsened following meals. She states this results in "involuntary muscle movements" causing her body to rock back and forther or turn side to side. She feels the whole digestive process, from the emptying of her stomach down to her rectum stating it feels like her "butt spins like doing hula hoops." She reports associated excess gas and bloating. 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 She has significantly modified her diet, to include avoidance of fast food and alcohol. She reports dehydration and malabsorption resulting in about a 10lb unintentional weight loss. Her symptoms are slowly improving with dietary changes, working out, and drinking apple cider vinegar. Her bowel habits are returning to normal but colon spasm persists. She is seeing neurology in December for evaluation of the muscle spasms.

## 2023-11-28 ENCOUNTER — DASHBOARD ENCOUNTERS (OUTPATIENT)
Age: 58
End: 2023-11-28

## 2023-11-28 ENCOUNTER — CLAIMS CREATED FROM THE CLAIM WINDOW (OUTPATIENT)
Dept: URBAN - METROPOLITAN AREA CLINIC 113 | Facility: CLINIC | Age: 58
End: 2023-11-28
Payer: MEDICARE

## 2023-11-28 VITALS
HEART RATE: 68 BPM | RESPIRATION RATE: 16 BRPM | DIASTOLIC BLOOD PRESSURE: 99 MMHG | WEIGHT: 148 LBS | BODY MASS INDEX: 23.23 KG/M2 | SYSTOLIC BLOOD PRESSURE: 144 MMHG | HEIGHT: 67 IN | TEMPERATURE: 97.3 F

## 2023-11-28 DIAGNOSIS — R19.4 ALTERED BOWEL FUNCTION: ICD-10-CM

## 2023-11-28 DIAGNOSIS — Z86.010 PERSONAL HISTORY OF COLONIC POLYPS: ICD-10-CM

## 2023-11-28 DIAGNOSIS — Z86.010 COLON POLYP HISTORY: ICD-10-CM

## 2023-11-28 DIAGNOSIS — R74.8 ELEVATED LIVER ENZYMES: ICD-10-CM

## 2023-11-28 PROCEDURE — 99213 OFFICE O/P EST LOW 20 MIN: CPT | Performed by: INTERNAL MEDICINE

## 2023-11-28 RX ORDER — AMLODIPINE BESYLATE 10 MG/1
1 TABLET TABLET ORAL ONCE A DAY
Status: ACTIVE | COMMUNITY

## 2023-11-28 NOTE — HPI-OTHER HISTORIES
CT scan abdomen pelvis with contrast. September 2023. Hepatic steatosis. No liver masses noted. Normal liver size. Gallbladder not visualized. Prior cholecystectomy. Pancreas normal. No other significant findings.  Labs October 2023. Creatinine 1.3. AST 32, ALT 24. Alk phos is 135. Total bilirubin 0.5. Hemoglobin 12.9. Platelet count 303.  Labs April 2023. . Magnesium 2.1. Creatinine 1.3. Alk phosphatase 146. Total bilirubin 0.3. AST 30, ALT 18. Antimitochondrial antibody negative.  Colonoscopy 1/6/2023:A 10 mm hyperplastic polyp removed from the ascending colon, markedly tortuous sigmoid colon, tattoo noted at 50 cm, normal TI, mild external hemorrhoids.  Repeat colonoscopy recommended in 3 years for surveillance.  Labs 11/4/2022:H/H12.6/39.3, MCV 84.5, , WBC 8.5.  CMP unremarkable aside from creatinine 1.3, normal LFTs with AST 22, ALT 11, ALP 74, T. bili 0.4.  Negative AMA, SHANTI, ASMA.  Ferritin 122.  Iron 58, TIBC 256, iron sat 23.  Negative hepatitis A antibody total, hepatitis B surface antigen, hepatitis B surface antibody, hepatitis B core antibody total, hepatitis C antibody.  Mildly elevated serum IgG, otherwise normal immunoglobulins.  Ultrasound with elastography July 2022.  Median score 5.69 indicating minimal probability of hepatic fibrosis.  CT of the chest without contrast 5/9/22 incidentally noted diffusely elevated liver density, most commonly seen in the setting of primary or secondary hemochromatosis or amiodarone use. Also bilateral bronchiectasis with prominent pulmonary artery suggesting the possibility of pulmonary arterial hypertension.   Labs August 2021 show AST 45, ALT 33, , Tbili 0.4.  Colonoscopy 6/24/21 by Dr. Stokes: a 20mm in the ascending colon, tattoo noted at 55cm, mild external hemorrhoids. Repeat colonoscopy recommended in 1 year.  Colonoscopy 10/3/16 by Dr. Quintanilla: one 20mm sessile serrated adenoma at the hepatic flexure which was removed piecemeal, diverticulosis in the sigmoid colon, nonbleeding internal hemorrhoids.  EGD was performed on 8/16/16 by Dr. Quintanilla. Findings included LA grade A reflux esophagitis, diffuse moderate inflammation characterized by congestion, erosions and erythema in the gastric body and antrum with bilious fluid found in the antrum. Gastric biopsies revealed mild inactive chronic gastritis with features of chemical/reactive gastropathy, focal intestinal metaplasia present, no H. pylori.  H. pylori breath test was negative on 8/1/16.

## 2024-05-23 ENCOUNTER — OFFICE VISIT (OUTPATIENT)
Dept: URBAN - METROPOLITAN AREA CLINIC 113 | Facility: CLINIC | Age: 59
End: 2024-05-23
Payer: MEDICARE

## 2024-05-23 VITALS
RESPIRATION RATE: 18 BRPM | BODY MASS INDEX: 23.18 KG/M2 | WEIGHT: 147.7 LBS | HEIGHT: 67 IN | SYSTOLIC BLOOD PRESSURE: 105 MMHG | DIASTOLIC BLOOD PRESSURE: 80 MMHG | TEMPERATURE: 96.9 F | HEART RATE: 59 BPM

## 2024-05-23 DIAGNOSIS — R74.8 ELEVATED LIVER ENZYMES: ICD-10-CM

## 2024-05-23 DIAGNOSIS — K76.0 NONALCOHOLIC FATTY LIVER DISEASE: ICD-10-CM

## 2024-05-23 PROCEDURE — 99213 OFFICE O/P EST LOW 20 MIN: CPT | Performed by: NURSE PRACTITIONER

## 2024-05-23 RX ORDER — AMLODIPINE BESYLATE 10 MG/1
1 TABLET TABLET ORAL ONCE A DAY
Status: ON HOLD | COMMUNITY

## 2024-05-23 RX ORDER — HYDROCHLOROTHIAZIDE 25 MG/1
1 TABLET IN THE MORNING TABLET ORAL ONCE A DAY
Status: ACTIVE | COMMUNITY

## 2025-04-04 ENCOUNTER — OFFICE VISIT (OUTPATIENT)
Dept: URBAN - METROPOLITAN AREA CLINIC 113 | Facility: CLINIC | Age: 60
End: 2025-04-04

## 2025-04-07 ENCOUNTER — OFFICE VISIT (OUTPATIENT)
Dept: URBAN - METROPOLITAN AREA CLINIC 113 | Facility: CLINIC | Age: 60
End: 2025-04-07
Payer: MEDICARE

## 2025-04-07 ENCOUNTER — LAB OUTSIDE AN ENCOUNTER (OUTPATIENT)
Dept: URBAN - METROPOLITAN AREA CLINIC 113 | Facility: CLINIC | Age: 60
End: 2025-04-07

## 2025-04-07 DIAGNOSIS — K76.0 NONALCOHOLIC FATTY LIVER DISEASE: ICD-10-CM

## 2025-04-07 DIAGNOSIS — R74.8 ELEVATED LIVER ENZYMES: ICD-10-CM

## 2025-04-07 PROCEDURE — 99214 OFFICE O/P EST MOD 30 MIN: CPT | Performed by: NURSE PRACTITIONER

## 2025-04-07 RX ORDER — AMILORIDE HYDROCHLORIDE AND HYDROCHLOROTHIAZIDE 5; 50 MG/1; MG/1
1 TABLET WITH FOOD TABLET ORAL ONCE A DAY
Status: ACTIVE | COMMUNITY

## 2025-04-07 RX ORDER — HYDROCHLOROTHIAZIDE 25 MG/1
1 TABLET IN THE MORNING TABLET ORAL ONCE A DAY
Status: ON HOLD | COMMUNITY

## 2025-04-07 RX ORDER — AMLODIPINE BESYLATE 10 MG/1
1 TABLET TABLET ORAL ONCE A DAY
Status: ON HOLD | COMMUNITY

## 2025-04-07 NOTE — HPI-TODAY'S VISIT:
Labs December 2024 with her PCP show AST 35, ALT 25, , T. bili 0.5.  H/H13.6/43, MCV 85.7, . Overall, she is doing well. She is following a holistic regimen. Her appetite has improved and weight is stable.

## 2025-04-07 NOTE — HPI-OTHER HISTORIES
Labs May 2024 showed AST 43, ALT 23, ALP 94.75, and T. bili 0.5.  CT scan abdomen pelvis with contrast. September 2023. Hepatic steatosis. No liver masses noted. Normal liver size. Gallbladder not visualized. Prior cholecystectomy. Pancreas normal. No other significant findings. Labs October 2023. Creatinine 1.3. AST 32, ALT 24. Alk phos is 135. Total bilirubin 0.5. Hemoglobin 12.9. Platelet count 303. Labs April 2023. . Magnesium 2.1. Creatinine 1.3. Alk phosphatase 146. Total bilirubin 0.3. AST 30, ALT 18. Antimitochondrial antibody negative. Colonoscopy 1/6/2023:A 10 mm hyperplastic polyp removed from the ascending colon, markedly tortuous sigmoid colon, tattoo noted at 50 cm, normal TI, mild external hemorrhoids.  Repeat colonoscopy recommended in 3 years for surveillance. Labs 11/4/2022:H/H12.6/39.3, MCV 84.5, , WBC 8.5.  CMP unremarkable aside from creatinine 1.3, normal LFTs with AST 22, ALT 11, ALP 74, T. bili 0.4.  Negative AMA, SHANTI, ASMA.  Ferritin 122.  Iron 58, TIBC 256, iron sat 23.  Negative hepatitis A antibody total, hepatitis B surface antigen, hepatitis B surface antibody, hepatitis B core antibody total, hepatitis C antibody.  Mildly elevated serum IgG, otherwise normal immunoglobulins. Ultrasound with elastography July 2022.  Median score 5.69 indicating minimal probability of hepatic fibrosis. CT of the chest without contrast 5/9/22 incidentally noted diffusely elevated liver density, most commonly seen in the setting of primary or secondary hemochromatosis or amiodarone use. Also bilateral bronchiectasis with prominent pulmonary artery suggesting the possibility of pulmonary arterial hypertension.  Labs August 2021 show AST 45, ALT 33, , Tbili 0.4. Colonoscopy 6/24/21 by Dr. Stokes: a 20mm in the ascending colon, tattoo noted at 55cm, mild external hemorrhoids. Repeat colonoscopy recommended in 1 year. Colonoscopy 10/3/16 by Dr. Quintanilla: one 20mm sessile serrated adenoma at the hepatic flexure which was removed piecemeal, diverticulosis in the sigmoid colon, nonbleeding internal hemorrhoids. EGD was performed on 8/16/16 by Dr. Quintanilla. Findings included LA grade A reflux esophagitis, diffuse moderate inflammation characterized by congestion, erosions and erythema in the gastric body and antrum with bilious fluid found in the antrum. Gastric biopsies revealed mild inactive chronic gastritis with features of chemical/reactive gastropathy, focal intestinal metaplasia present, no H. pylori. H. pylori breath test was negative on 8/1/16.

## 2025-04-07 NOTE — HPI-TODAY'S VISIT:
Ms. Simms is a 59 female with a history of sarcoidosis complicated by pulmonary hypertension (Dr. Márquez), right-sided heart failure, fibromyalgia, presenting for follow-up regarding elevated liver enzymes and NAFLD. She was seen in the office in May 2024 for follow-up regarding elevated liver enzymes secondary to nonalcoholic fatty liver disease. Recent labs showed essentially normal LFTs. Previous imaging was without evidence for significant fibrosis. Conservative management was favored. She was recommend repeat LFTs every 6 months with PCP, with consideration for repeat abdominal ultrasound with elastography at follow-up.

## 2025-04-18 ENCOUNTER — CLAIMS CREATED FROM THE CLAIM WINDOW (OUTPATIENT)
Dept: URBAN - METROPOLITAN AREA CLINIC 117 | Facility: CLINIC | Age: 60
End: 2025-04-18
Payer: MEDICARE

## 2025-04-18 ENCOUNTER — OFFICE VISIT (OUTPATIENT)
Dept: URBAN - METROPOLITAN AREA CLINIC 112 | Facility: CLINIC | Age: 60
End: 2025-04-18
Payer: MEDICARE

## 2025-04-18 DIAGNOSIS — R74.8 ELEVATED LIVER ENZYMES: ICD-10-CM

## 2025-04-18 PROCEDURE — 76981 USE PARENCHYMA: CPT | Performed by: INTERNAL MEDICINE

## 2025-04-18 RX ORDER — AMLODIPINE BESYLATE 10 MG/1
1 TABLET TABLET ORAL ONCE A DAY
Status: ON HOLD | COMMUNITY

## 2025-04-18 RX ORDER — HYDROCHLOROTHIAZIDE 25 MG/1
1 TABLET IN THE MORNING TABLET ORAL ONCE A DAY
Status: ON HOLD | COMMUNITY

## 2025-04-18 RX ORDER — AMILORIDE HYDROCHLORIDE AND HYDROCHLOROTHIAZIDE 5; 50 MG/1; MG/1
1 TABLET WITH FOOD TABLET ORAL ONCE A DAY
Status: ACTIVE | COMMUNITY

## 2025-04-25 ENCOUNTER — TELEPHONE ENCOUNTER (OUTPATIENT)
Dept: URBAN - METROPOLITAN AREA CLINIC 113 | Facility: CLINIC | Age: 60
End: 2025-04-25

## 2025-07-04 NOTE — HPI-OTHER HISTORIES
Colonoscopy 1/6/2023:A 10 mm hyperplastic polyp removed from the ascending colon, markedly tortuous sigmoid colon, tattoo noted at 50 cm, normal TI, mild external hemorrhoids.  Repeat colonoscopy recommended in 3 years for surveillance. Labs 11/4/2022:H/H12.6/39.3, MCV 84.5, , WBC 8.5.  CMP unremarkable aside from creatinine 1.3, normal LFTs with AST 22, ALT 11, ALP 74, T. bili 0.4.  Negative AMA, SHANTI, ASMA.  Ferritin 122.  Iron 58, TIBC 256, iron sat 23.  Negative hepatitis A antibody total, hepatitis B surface antigen, hepatitis B surface antibody, hepatitis B core antibody total, hepatitis C antibody.  Mildly elevated serum IgG, otherwise normal immunoglobulins. Ultrasound with elastography July 2022.  Median score 5.69 indicating minimal probability of hepatic fibrosis. CT of the chest without contrast 5/9/22 incidentally noted diffusely elevated liver density, most commonly seen in the setting of primary or secondary hemochromatosis or amiodarone use. Also bilateral bronchiectasis with prominent pulmonary artery suggesting the possibility of pulmonary arterial hypertension.  Labs August 2021 show AST 45, ALT 33, , Tbili 0.4. Colonoscopy 6/24/21 by Dr. Stokes: a 20mm in the ascending colon, tattoo noted at 55cm, mild external hemorrhoids. Repeat colonoscopy recommended in 1 year. Colonoscopy 10/3/16 by Dr. Quintanilla: one 20mm sessile serrated adenoma at the hepatic flexure which was removed piecemeal, diverticulosis in the sigmoid colon, nonbleeding internal hemorrhoids. EGD was performed on 8/16/16 by Dr. Quintanilla. Findings included LA grade A reflux esophagitis, diffuse moderate inflammation characterized by congestion, erosions and erythema in the gastric body and antrum with bilious fluid found in the antrum. Gastric biopsies revealed mild inactive chronic gastritis with features of chemical/reactive gastropathy, focal intestinal metaplasia present, no H. pylori. H. pylori breath test was negative on 8/1/16. impaired balance/decreased strength